# Patient Record
Sex: FEMALE | Race: WHITE | URBAN - METROPOLITAN AREA
[De-identification: names, ages, dates, MRNs, and addresses within clinical notes are randomized per-mention and may not be internally consistent; named-entity substitution may affect disease eponyms.]

---

## 2024-12-09 ENCOUNTER — OFFICE VISIT (OUTPATIENT)
Dept: URGENT CARE | Age: 39
End: 2024-12-09
Payer: COMMERCIAL

## 2024-12-09 VITALS
OXYGEN SATURATION: 97 % | RESPIRATION RATE: 18 BRPM | HEART RATE: 74 BPM | DIASTOLIC BLOOD PRESSURE: 85 MMHG | WEIGHT: 185.5 LBS | SYSTOLIC BLOOD PRESSURE: 115 MMHG | TEMPERATURE: 96.9 F

## 2024-12-09 DIAGNOSIS — J01.90 ACUTE NON-RECURRENT SINUSITIS, UNSPECIFIED LOCATION: Primary | ICD-10-CM

## 2024-12-09 PROCEDURE — 1036F TOBACCO NON-USER: CPT | Performed by: STUDENT IN AN ORGANIZED HEALTH CARE EDUCATION/TRAINING PROGRAM

## 2024-12-09 PROCEDURE — 99213 OFFICE O/P EST LOW 20 MIN: CPT | Performed by: STUDENT IN AN ORGANIZED HEALTH CARE EDUCATION/TRAINING PROGRAM

## 2024-12-09 RX ORDER — AMOXICILLIN 875 MG/1
875 TABLET, FILM COATED ORAL 2 TIMES DAILY
Qty: 14 TABLET | Refills: 0 | Status: SHIPPED | OUTPATIENT
Start: 2024-12-09 | End: 2024-12-16

## 2024-12-09 RX ORDER — PHENTERMINE HYDROCHLORIDE 37.5 MG/1
TABLET ORAL
COMMUNITY
Start: 2024-10-02

## 2024-12-09 RX ORDER — PHENOL 1.4 %
1 AEROSOL, SPRAY (ML) MUCOUS MEMBRANE DAILY
COMMUNITY
Start: 2018-08-29

## 2024-12-09 RX ORDER — FERROUS SULFATE 325(65) MG
1 TABLET ORAL DAILY
COMMUNITY
Start: 2018-08-29

## 2024-12-09 RX ORDER — SERTRALINE HYDROCHLORIDE 100 MG/1
TABLET, FILM COATED ORAL
COMMUNITY
Start: 2018-04-17

## 2024-12-09 RX ORDER — VENLAFAXINE HYDROCHLORIDE 75 MG/1
75 CAPSULE, EXTENDED RELEASE ORAL DAILY
COMMUNITY

## 2024-12-09 NOTE — PROGRESS NOTES
Subjective   Patient ID: Savannah Quintanilla is a 39 y.o. female. They present today with a chief complaint of sinus pressure (Head cold, started last weekend).    History of Present Illness  Patient reports symptoms have been present for ~6-7 days  Endorses sinus pain/pressure   Endorses thick mucous discharge   Endorses colored mucous - yellow  Endorses teeth discomfort   Reports that they felt like they have been getting worse  Notes proceeding cold like symptoms   Reports trying the following without significant benefit - nasal saline  Reports hx of sinusitis and had similar sx that improved with abx - amoxicillin      Past Medical History  Allergies as of 2024 - Reviewed 2024   Allergen Reaction Noted    Codeine Anaphylaxis and Unknown 2011    Azithromycin Unknown 2024       (Not in a hospital admission)       Past Medical History:   Diagnosis Date    Localized enlarged lymph nodes     Lymphadenopathy, submandibular    Malignant melanoma of right lower limb, including hip (Multi)     Melanoma of right posterior calf    Personal history of diseases of the blood and blood-forming organs and certain disorders involving the immune mechanism 2013    History of iron deficiency anemia    Personal history of other diseases of the respiratory system     History of laryngitis    Personal history of other diseases of the respiratory system     Personal history of sinusitis    Personal history of other infectious and parasitic diseases 2014    History of viral gastroenteritis    Personal history of other specified conditions     History of syncope    Personal history of urinary (tract) infections 2014    History of urinary tract infection       Past Surgical History:   Procedure Laterality Date     SECTION, CLASSIC  2018     Section    OTHER SURGICAL HISTORY  2018    Excision Of Leg Soft Tissue Tumor    TUBAL LIGATION  2018    Tubal Ligation         reports that she has never smoked. She has never used smokeless tobacco.                               Objective    Vitals:    12/09/24 1428   BP: 115/85   Pulse: 74   Resp: 18   Temp: 36.1 °C (96.9 °F)   SpO2: 97%   Weight: 84.1 kg (185 lb 8 oz)     No LMP recorded.    Physical Exam  Constitutional:       General: She is not in acute distress.     Appearance: Normal appearance. She is not toxic-appearing or diaphoretic.   HENT:      Right Ear: Tympanic membrane, ear canal and external ear normal. There is no impacted cerumen.      Left Ear: Tympanic membrane, ear canal and external ear normal. There is no impacted cerumen.      Nose: No rhinorrhea.   Eyes:      General: No scleral icterus.        Right eye: No discharge.         Left eye: No discharge.      Extraocular Movements: Extraocular movements intact.   Cardiovascular:      Rate and Rhythm: Normal rate and regular rhythm.   Pulmonary:      Effort: Pulmonary effort is normal. No respiratory distress.      Breath sounds: No wheezing.   Musculoskeletal:      Cervical back: Normal range of motion.   Neurological:      Mental Status: She is alert.   Psychiatric:         Mood and Affect: Mood normal.         Behavior: Behavior normal.         Thought Content: Thought content normal.      Comments: Pleasant         Procedures    Point of Care Test & Imaging Results from this visit:      Diagnostic study results (if any) were reviewed by Juanpablo Grimaldo MD.    Assessment/Plan   Allergies, medications, history, and pertinent labs/EKGs/Imaging reviewed by Juanpablo Grimaldo MD.     Medical Decision Making:    Patient's symptoms likely 2/2 viral sinusitis vs less likely bacterial sinusitis. Supportive care with NSAIDs/Tylenol, mucinex, pseudoephedrine, and intranasal steroids/nasal saline. Will provide antibiotic script for patient to start taking after 7-10 days from start of symptoms and development of any of the following: thick pus like nasal discharge, referred  teeth pain, symptoms worsening or significant facial pain . Follow up with PCP or UC if symptoms fail to improve.      Orders and Diagnoses  Diagnoses and all orders for this visit:  Acute non-recurrent sinusitis, unspecified location  -     amoxicillin (Amoxil) 875 mg tablet; Take 1 tablet (875 mg) by mouth 2 times a day for 7 days.      Patient disposition: Home      Medical Admin Record      Follow Up Instructions  No follow-ups on file.    Electronically signed by Juanpablo Grimaldo MD  3:09 PM